# Patient Record
Sex: FEMALE | Race: WHITE | NOT HISPANIC OR LATINO | URBAN - METROPOLITAN AREA
[De-identification: names, ages, dates, MRNs, and addresses within clinical notes are randomized per-mention and may not be internally consistent; named-entity substitution may affect disease eponyms.]

---

## 2019-08-06 ENCOUNTER — TELEPHONE (OUTPATIENT)
Dept: TRANSPLANT | Facility: CLINIC | Age: 69
End: 2019-08-06

## 2019-08-06 NOTE — TELEPHONE ENCOUNTER
----- Message from Marlene Montes sent at 8/6/2019  6:54 PM CDT -----    Called and sp to pt daughter, Elisha, about sending pt insur cards, back and front. She will e-mail a coyp to [email or web address]

## 2019-08-12 ENCOUNTER — TELEPHONE (OUTPATIENT)
Dept: TRANSPLANT | Facility: CLINIC | Age: 69
End: 2019-08-12

## 2019-08-12 DIAGNOSIS — B19.20 COMPENSATED HCV CIRRHOSIS: ICD-10-CM

## 2019-08-12 DIAGNOSIS — K74.69 COMPENSATED HCV CIRRHOSIS: ICD-10-CM

## 2019-08-14 ENCOUNTER — TELEPHONE (OUTPATIENT)
Dept: TRANSPLANT | Facility: CLINIC | Age: 69
End: 2019-08-14

## 2019-08-14 NOTE — TELEPHONE ENCOUNTER
----- Message from Marlene Montes sent at 8/14/2019 10:15 AM CDT -----    Called and sp to pt daughter to [facility] pt appts; pt is [facility]'ed for FAST PASS 9/19 & 9/20. Will e-mail IDRIS

## 2019-08-16 DIAGNOSIS — Z76.82 ORGAN TRANSPLANT CANDIDATE: ICD-10-CM

## 2019-08-16 DIAGNOSIS — B19.20 COMPENSATED CIRRHOSIS RELATED TO HEPATITIS C VIRUS (HCV): Primary | ICD-10-CM

## 2019-08-16 DIAGNOSIS — K74.69 COMPENSATED CIRRHOSIS RELATED TO HEPATITIS C VIRUS (HCV): Primary | ICD-10-CM

## 2019-08-24 NOTE — TELEPHONE ENCOUNTER
Natalie Antony reviewed discharge instructions with the parent. The parent verbalized understanding. All questions and concerns were addressed. The patient is discharged ambulatory in the care of family members with instructions and prescriptions in hand. Pt is alert and oriented x 4. Respirations are clear and unlabored. Referral received from Dr Farndy KENNY   Patient with AIH/SORTO/HCV CIRR . MELD 22  ICD-10:  B19.20, K74.69  Referred for liver transplant for  EVALUATION.    Referral completed and forwarded to [facility].          Insurance: [employer]

## 2019-09-16 ENCOUNTER — TELEPHONE (OUTPATIENT)
Dept: TRANSPLANT | Facility: CLINIC | Age: 69
End: 2019-09-16

## 2019-09-16 NOTE — TELEPHONE ENCOUNTER
Call pt to do history and physical checklist no answer but left detail message for pt to return call

## 2019-09-18 ENCOUNTER — TELEPHONE (OUTPATIENT)
Dept: TRANSPLANT | Facility: CLINIC | Age: 69
End: 2019-09-18

## 2019-09-18 NOTE — TELEPHONE ENCOUNTER
Patient called to confirm that she will be attending her scheduled appointments for Liver Transplant Fast Pass scheduled to start on 9/18/19 at 0730.  Spoke with the patient's daughter who confirms that the they will be present for the appointments.  Appointments reviewed along with the location , directions and special instructions.  Questions answered and concerns addressed at this time.

## 2019-09-19 ENCOUNTER — PATIENT MESSAGE (OUTPATIENT)
Dept: TRANSPLANT | Facility: CLINIC | Age: 69
End: 2019-09-19

## 2019-09-19 ENCOUNTER — EVALUATION (OUTPATIENT)
Dept: TRANSPLANT | Facility: CLINIC | Age: 69
End: 2019-09-19
Payer: MEDICARE

## 2019-09-19 VITALS
WEIGHT: 204.81 LBS | HEART RATE: 84 BPM | BODY MASS INDEX: 31.04 KG/M2 | RESPIRATION RATE: 16 BRPM | OXYGEN SATURATION: 98 % | SYSTOLIC BLOOD PRESSURE: 120 MMHG | HEIGHT: 68 IN | TEMPERATURE: 97 F | DIASTOLIC BLOOD PRESSURE: 57 MMHG

## 2019-09-19 DIAGNOSIS — B19.20 COMPENSATED CIRRHOSIS RELATED TO HEPATITIS C VIRUS (HCV): ICD-10-CM

## 2019-09-19 DIAGNOSIS — Z01.818 PRE-TRANSPLANT EVALUATION FOR CHRONIC LIVER DISEASE: ICD-10-CM

## 2019-09-19 DIAGNOSIS — K74.69 COMPENSATED CIRRHOSIS RELATED TO HEPATITIS C VIRUS (HCV): ICD-10-CM

## 2019-09-19 DIAGNOSIS — K74.69 DECOMPENSATED CIRRHOSIS RELATED TO HEPATITIS C VIRUS (HCV): ICD-10-CM

## 2019-09-19 DIAGNOSIS — Z76.82 ORGAN TRANSPLANT CANDIDATE: ICD-10-CM

## 2019-09-19 DIAGNOSIS — B19.20 DECOMPENSATED CIRRHOSIS RELATED TO HEPATITIS C VIRUS (HCV): ICD-10-CM

## 2019-09-19 LAB
AMPHET+METHAMPHET UR QL: NEGATIVE
BARBITURATES UR QL SCN>200 NG/ML: NEGATIVE
BENZODIAZ UR QL SCN>200 NG/ML: NEGATIVE
BZE UR QL SCN: NEGATIVE
CANNABINOIDS UR QL SCN: NEGATIVE
CREAT UR-MCNC: 85 MG/DL (ref 15–325)
ETHANOL UR-MCNC: <10 MG/DL
METHADONE UR QL SCN>300 NG/ML: NEGATIVE
OPIATES UR QL SCN: NEGATIVE
PCP UR QL SCN>25 NG/ML: NEGATIVE
TOXICOLOGY INFORMATION: NORMAL

## 2019-09-19 PROCEDURE — 99999 PR PBB SHADOW E&M-EST. PATIENT-LVL IV: ICD-10-PCS | Mod: PBBFAC,TXP,, | Performed by: INTERNAL MEDICINE

## 2019-09-19 PROCEDURE — 80307 DRUG TEST PRSMV CHEM ANLYZR: CPT | Mod: TXP

## 2019-09-19 PROCEDURE — 99205 OFFICE O/P NEW HI 60 MIN: CPT | Mod: S$GLB,TXP,, | Performed by: INTERNAL MEDICINE

## 2019-09-19 PROCEDURE — 97802 PR MED NUTR THER, 1ST, INDIV, EA 15 MIN: ICD-10-PCS | Mod: S$GLB,TXP,, | Performed by: DIETITIAN, REGISTERED

## 2019-09-19 PROCEDURE — 97802 MEDICAL NUTRITION INDIV IN: CPT | Mod: S$GLB,TXP,, | Performed by: DIETITIAN, REGISTERED

## 2019-09-19 PROCEDURE — 99203 PR OFFICE/OUTPT VISIT, NEW, LEVL III, 30-44 MIN: ICD-10-PCS | Mod: S$GLB,TXP,, | Performed by: TRANSPLANT SURGERY

## 2019-09-19 PROCEDURE — 99205 PR OFFICE/OUTPT VISIT, NEW, LEVL V, 60-74 MIN: ICD-10-PCS | Mod: S$GLB,TXP,, | Performed by: INTERNAL MEDICINE

## 2019-09-19 PROCEDURE — 99999 PR PBB SHADOW E&M-EST. PATIENT-LVL IV: CPT | Mod: PBBFAC,TXP,, | Performed by: INTERNAL MEDICINE

## 2019-09-19 PROCEDURE — 99204 OFFICE O/P NEW MOD 45 MIN: CPT | Mod: S$GLB,TXP,, | Performed by: PHYSICIAN ASSISTANT

## 2019-09-19 PROCEDURE — 99203 OFFICE O/P NEW LOW 30 MIN: CPT | Mod: S$GLB,TXP,, | Performed by: TRANSPLANT SURGERY

## 2019-09-19 PROCEDURE — 99204 PR OFFICE/OUTPT VISIT, NEW, LEVL IV, 45-59 MIN: ICD-10-PCS | Mod: S$GLB,TXP,, | Performed by: PHYSICIAN ASSISTANT

## 2019-09-19 RX ORDER — ONDANSETRON 4 MG/1
4 TABLET, FILM COATED ORAL EVERY 6 HOURS PRN
COMMUNITY

## 2019-09-19 RX ORDER — LACTULOSE 10 G/15ML
10 SOLUTION ORAL; RECTAL 2 TIMES DAILY
COMMUNITY

## 2019-09-19 RX ORDER — CALCIUM CARBONATE 500(1250)
2 TABLET ORAL DAILY
COMMUNITY

## 2019-09-19 RX ORDER — SPIRONOLACTONE 100 MG/1
100 TABLET, FILM COATED ORAL DAILY
COMMUNITY

## 2019-09-19 RX ORDER — ATENOLOL 25 MG/1
12.5 TABLET ORAL EVERY OTHER DAY
COMMUNITY

## 2019-09-19 RX ORDER — ENOXAPARIN SODIUM 100 MG/ML
80 INJECTION SUBCUTANEOUS 2 TIMES DAILY
COMMUNITY

## 2019-09-19 RX ORDER — CHOLECALCIFEROL (VITAMIN D3) 25 MCG
2000 TABLET ORAL DAILY
COMMUNITY

## 2019-09-19 RX ORDER — FUROSEMIDE 40 MG/1
40 TABLET ORAL DAILY
COMMUNITY

## 2019-09-19 NOTE — PROGRESS NOTES
Patient seen in clinic with caregiver.  Consents reviewed and signatures obtained.  Patient appointments reviewed location and special instructions.  Patient questions answered and concerns addressed.  Patient assisted with patient portal.  Questions answered and concerns addressed.

## 2019-09-19 NOTE — PROGRESS NOTES
Pre Transplant Infectious Diseases Consult  Liver Transplant Recipient Evaluation    Requesting Physician: Dr. Tonia Young    Reason for Visit:  Pre Transplant Evaluation    Organ:  Liver    Etiology of Liver Disease:  Hepatitis C; treated and cured    History of Prior Transplant:  No    Currently taking immunosuppressants/steroids:  No    History of Splenectomy:  No    Infectious History:  Current/recent infections or currently taking antibiotics?  No  History of recurrent infections (sinuses, throat, bladder/kidneys, intestines, skin, dental, lung, catheter (HD/PD) related, or peritonitis/SBP)?  No  Any major hospitalizations due to infection?  No  If diabetic, history of diabetic foot infection/osteomyelitis?  No  History of shingles?  Yes; had zostavax vaccine, not shingrix  History of STDs (syphilis, viral hepatitis, HIV)?  Yes - Hepatitis C; treated  Exposure to TB or ever had a positive TB skin test?  No  History of residence in coccidioides endemic areas ([facility]?  No  Any foreign travel?  No     Social/Environmental:  Occupational:  [employer]; [employer]; [employer]; now retired  Animal exposures (dogs, cats, farm animals, bird cages, fish tanks):  No  Hobbies (gardening, hike, fish/hunting, etc): indoor  Consumption of raw/undercooked meat or seafood?  No  Any injectable or smoked recreational drug use?  No    Immunization History:  Childhood vaccines:  Yes  Last Flu shot: 2018  Tetanus/TDAP: 2016  Hepatitis A: Hep A IgG (+) chart review  Hepatitis B: 2016- 2017; [id number]  Prevnar-13: 2017  Pneumovax-23: 2018  Shingles (Zostavax/Shingrix): 2011      Immunizations: (from [facility] records)  Name Administration Dates Next Due   HBV adult (Hepatitis B) 02/20/2019, 01/16/2019, 12/12/2018, 06/05/2017, 10/17/2016, 09/02/2016     INFs (Influenza split virus ). 09/19/2014, 10/29/2013     INFs 9yrs-adult (AFLURIA) (Influenza) 10/22/2015     INFs Pres Free High Dose (FLUZONE) (influenza) 09/23/2018, 09/21/2016      INFs pres free 3yrs-adult (Influenza) 10/16/2012, 10/19/2011, 11/15/2010     INFs pres free 65yrs and over (FLUAD Trivalent IIV3) (Influenza) 10/28/2017     PNUcn13 (PREVNAR 13) (Pneumococcal conjugate, 13 valent) 01/12/2017, 01/19/2016     PPSV23 (Pneumococcal polysaccharide) 05/24/2018     Tdap (ADACEL) (Tetanus, diphtheria, acellular pertussis) 01/19/2016     ZOS (Zostervirus live, shingles) 11/07/2011     td 7yrs-adult (tetanus, diphtheria), adsorbed 02/02/2005        Serologies:  Reviewed per [facility] labs in care everywhere:    Quant gold negative in 2017  HIV 1 and 2 antibodies negative 2018  Strongyloides IgG negative 2019  Coccidioides IgG negative 2019  Varicella IgG positive 2018  Hep C antibody (+)  Hep B S antigen/Hep B core antibody negative  RPR- drawn today; pending.   HIV pending from today    Review of Systems   Constitution: Negative for chills, decreased appetite, fever, malaise/fatigue, night sweats, weight gain and weight loss.   HENT: Negative for congestion, ear pain, hearing loss, hoarse voice, sore throat and tinnitus.    Eyes: Negative for blurred vision, redness and visual disturbance.   Cardiovascular: Negative for chest pain, leg swelling and palpitations.   Respiratory: Negative for cough, hemoptysis, shortness of breath, sputum production and wheezing.    Endocrine: Negative for cold intolerance and heat intolerance.   Hematologic/Lymphatic: Negative for adenopathy. Does not bruise/bleed easily.   Skin: Negative for dry skin, itching, rash and suspicious lesions.   Musculoskeletal: Negative for back pain, joint pain, myalgias and neck pain.   Gastrointestinal: Negative for abdominal pain, constipation, diarrhea, heartburn, nausea and vomiting.   Genitourinary: Negative for dysuria, flank pain, frequency, hematuria, hesitancy and urgency.   Neurological: Negative for dizziness, headaches, numbness, paresthesias and weakness.   Psychiatric/Behavioral: Negative for depression and  memory loss. The patient does not have insomnia and is not nervous/anxious.    Allergic/Immunologic: Negative for environmental allergies, HIV exposure, hives and persistent infections.     Physical Exam   Constitutional: She is oriented to person, place, and time. She appears well-developed and well-nourished. No distress.   HENT:   Head: Normocephalic and atraumatic.   Mouth/Throat: Uvula is midline, oropharynx is clear and moist and mucous membranes are normal. No oral lesions.   Eyes: Pupils are equal, round, and reactive to light. Conjunctivae and EOM are normal. No scleral icterus.   Neck: Normal range of motion.   Cardiovascular: Normal rate, regular rhythm and normal heart sounds. Exam reveals no gallop and no friction rub.   No murmur heard.  Pulmonary/Chest: Effort normal and breath sounds normal. No respiratory distress. She has no wheezes. She has no rales.   Abdominal: Soft. Bowel sounds are normal. She exhibits no distension and no mass. There is no hepatosplenomegaly. There is no tenderness. There is no rebound and no guarding.   Musculoskeletal: Normal range of motion. She exhibits edema.   Lymphadenopathy:        Head (right side): No submental, no submandibular, no tonsillar, no preauricular, no posterior auricular and no occipital adenopathy present.        Head (left side): No submental, no submandibular, no tonsillar, no preauricular, no posterior auricular and no occipital adenopathy present.     She has no cervical adenopathy.   Neurological: She is alert and oriented to person, place, and time.   Skin: Skin is warm, dry and intact. No rash noted.   Psychiatric: She has a normal mood and affect. Her behavior is normal.            Counseling:   I discussed with the patient the risk for increased susceptibility to infections following transplantation including increased risk for infection right after transplant and if rejection should occur.  The patient has been counseled on the importance of  vaccinations including but not limited to a yearly flu vaccine. Patient was also instructed to encourage that family/caretakers receive their flu vaccine yearly. The patient was encouraged to contact us about any problems that may develop after immunizations and possible side effects were reviewed.     Specific guidance has been provided to the patient regarding the patient's occupation, hobbies and activities to avoid future infectious complications. These include but are not limited to: avoiding raw/undercooked meats and seafood, avoiding unpasteurized milk/cheeses, proper (hand) hygiene, contact with animals and appropriate vaccination of animals, use of mosquito/tick precautions, avoiding walking barefoot, avoiding sick contacts, and seeking medical advice prior to foreign travel (specifically developing countries).     Transplant Candidacy: Based on available information, there are no identified significant barriers to transplantation from an infectious disease standpoint pending acceptable serologies and subject to recommendations below.     Final determination of transplant candidacy will be made once evaluation is complete and reviewed by the Transplant Selection Committee.      ID recommendations:      HIV and RPR pending. If positive, please refer to ID clinic.    Vaccines:   Recommend shingrix (rx given to receive at [facility])  Recommend flu vaccine (at [facility])  If Hep B S Ab negative, recommend heplisav-B series    Zayda Tripp PA-C

## 2019-09-19 NOTE — PROGRESS NOTES
Transplant Hepatology  Liver Transplant Recipient Evaluation      Consultation started: 9/19/2019 at 10:23 AM     Original Referring Provider:   Current Corresponding Physician:     CHEYENNE Native Liver Diagnosis: Cirrhosis: Type C    Reason for Visit: evaluation for liver transplant     Subjective:     Shannon Harden is a 69 y.o. female with ESLD secondary to chronic hepatitis C and possible SORTO.  She is accompanied by her daughter.    The patient reports being diagnosed with hepatitis C in 2003 incidentally while following with oncology for treatment of left sided breast cancer.  Likely exposure was cholecystectomy in 1970s which she reports was complicated and required blood transfusion.  She was referred for treatment with interferon and ribavirin in 2005 and achieved SVR after 6 months of therapy.  Hepatitis C RNA is negative confirming cure.    The patient reports that liver tests have remained elevated despite hepatitis C treatment.  She has been worked up for other etiologies of chronic liver disease and also carries a diagnosis of SORTO after steatosis was noted on imaging but does not have traditional risk factors.  There is no history of DM, hypertension, hyperlipidemia or obesity.  The patient does not have a positive alcohol history.  Ultimately underwent liver biopsy in 2017 and there was a nonspecific interface and lobular hepatitis without plasma cell with evidence of cirrhosis.  Serologic work-up for AIH is negative ASMA, IgG 1930 and positive MARCOS.      Complications of portal hypertension include recurrent left sided hepatic hydrothorax.  She underwent weekly thoracentesis x 1 year but more recently effusion has become loculated and no significant symptom relief with procedure.  She reports that she has not thoracentesis x 6 weeks and remains clinically stable with mild dyspnea with exertion but able to perform ADLs without significant difficulty.  Mild ascites with most recent paracentesis 1 week ago  with 1.1L fluid removal.  She is on lasix 40mg and spironolactone 100mg daily.  Titration has been limited by DARYL and hyponatremia.  HE present requiring lactulose with generally 2-3 bowel movements daily.  She has not had hospitalizations related to HE and never been prescribed rifaximin.  No history of GIB.     Recent hospitalization in August 2019 for choledocholithiasis requiring ERCP and complicated by hyponatremia.      The patient is currently listed at [facility].  MELD has been in high teens.      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 Negative for activity change, chills, fever and unexpected weight change.   HENT: Negative for hearing loss, rhinorrhea and trouble swallowing.    Eyes: Negative for visual disturbance.   Respiratory: Positive for shortness of breath.    Cardiovascular: Negative for chest pain.   Gastrointestinal: Negative for abdominal distention, abdominal pain, nausea and vomiting.   Endocrine: Positive for cold intolerance. Negative for heat intolerance.   Musculoskeletal: Negative for gait problem.   Skin: Negative for rash.   Neurological: Negative for weakness and headaches.   Hematological: Negative for adenopathy. Bruises/bleeds easily.   Psychiatric/Behavioral: Positive for decreased concentration. Negative for confusion.       Objective:   Physical Exam   Constitutional: She is  oriented to person, place, and time. She appears well-developed and well-nourished. No distress.   Patient able to get onto exam table independently   HENT:   Head: Normocephalic and atraumatic.   Mouth/Throat: Oropharynx is clear and moist. No oropharyngeal exudate.   Eyes: Pupils are equal, round, and reactive to light. EOM are normal. No scleral icterus.   Neck: Normal range of motion. Neck supple. No thyromegaly present.   Cardiovascular: Normal rate, regular rhythm and normal heart sounds. Exam reveals no gallop and no friction rub.   No murmur heard.  Pulmonary/Chest: Effort normal. No respiratory distress. She has no wheezes. She has no rales.   Decreased BS on left   Abdominal: Soft. Bowel sounds are normal. She exhibits no distension. There is no tenderness.   Musculoskeletal: Normal range of motion. She exhibits edema (mild).   Lymphadenopathy:     She has no cervical adenopathy.   Neurological: She is alert and oriented to person, place, and time. No cranial nerve deficit.   Skin: Skin is warm and dry. No rash noted.   Ecchymoses on bilateral UEs   Psychiatric: She has a normal mood and affect. Her behavior is normal.   Vitals reviewed.       MELD-Na score: 18 at 9/19/2019  7:40 AM  MELD score: 14 at 9/19/2019  7:40 AM  Calculated from:  Serum Creatinine: 1.1 mg/dL at 9/19/2019  7:40 AM  Serum Sodium: 132 mmol/L at 9/19/2019  7:40 AM  Total Bilirubin: 3.0 mg/dL at 9/19/2019  7:40 AM  INR(ratio): 1.2 at 9/19/2019  7:40 AM  Age: 69 years     Lab Results   Component Value Date     09/19/2019    BUN 16 09/19/2019    CREATININE 1.1 09/19/2019    CALCIUM 9.9 09/19/2019     (L) 09/19/2019    K 4.5 09/19/2019     09/19/2019    PROT 6.8 09/19/2019    CO2 24 09/19/2019    WBC 3.17 (L) 09/19/2019    RBC 3.03 (L) 09/19/2019    HGB 9.5 (L) 09/19/2019    HCT 30.3 (L) 09/19/2019    PLT 68 (L) 09/19/2019     Lab Results   Component Value Date    ALBUMIN 3.2 (L) 09/19/2019    BILITOT 3.0 (H) 09/19/2019     AST 74 (H) 09/19/2019    ALT 35 09/19/2019    ALKPHOS 367 (H) 09/19/2019    LABPROT 12.0 09/19/2019    INR 1.2 09/19/2019     Mildly elevated CEA 9.1  Restrictive lung pattern with low DLCO in setting of known large recurrent pleural effusion    EGD:   Varices x 2 columns s/p banding  PHG    Colonoscopy 1/2017 - 14 polyps throughout colon - hyperplastic (recommended for 1 year f/u)  Need colonoscopy from 1/2018 if performed     Diagnostics: none    1. 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 Organ transplant candidate    3. Pre-transplant evaluation for chronic liver disease    4. Compensated HCV cirrhosis        Transplant Hepatology - Candidacy   Assessment/Plan:     Transplant Candidacy: Shannon Harden is a 69 y.o. female with ESLD secondary to hepatitis C here for evaluation for possible OLT.  In my opinion, she is a good candidate for liver transplant.  She will be presented to selection committee after all tests and evaluations are complete.    Patient has no medical contraindications for liver transplant evaluation.  Given that frequent thoracentesis is no longer required, agree with transitioning back to coumadin from lovenox.  If patient is approved for transplant listing, would expect MELD to increase to mid 20s with therapeutic INR.  Patient and daughter understand that she may be asked to return for favorable position on transplant list.  This will be based on her surgical complexity given her prior complicated cholecystectomy and will be determined during selection meeting.    Hepatic hydrothorax:  No change in diuretic therapy    Hyponatremia:  Mild, no significant fluid restriction required at this time     HE:  Discussed titration of lactulose for 2-3 bowel movements; recommend starting zinc 220mg daily given zinc deficiency from external labs    Functional status:  Appropriate, no significant concerns for frailty at this time.  Encouraged to maintain adequate nutrition and  functional status.      RTC for relocation based on committee decision    Tonia Young MD         UNOS Patient Status  Functional Status: 60% - Requires occasional assistance but is able to care for needs  Physical Capacity: No Limitations    Outside Records Request: colonoscopy report 1/2018

## 2019-09-19 NOTE — PROGRESS NOTES
PRE EDUCATION TEACHING NOTE    Shannon Harden was seen in clinic today.  Handbook on pre-liver transplant information (see outline below) was given to the patient and time was allowed for questions.  Patient's daughter, Elisha accompanied her.  Informed consent signed and written information given on selection criteria.    LIVER TRANSPLANT WORK-UP EDUCATION   I. UNDERSTANDING THE TRANSPLANT PROCESS     A. Transplant team      B. MELD score      C. Balancing urgency and outcome     D. Liver Transplant Options         1.  Donor         2. Living Donor--rationale, benefits     E. Transplant Work-up         1. Medical         2. Psychological and Social--lifetime commitment, life changes, personal plan/ goal         3. Financial--fundraising     F.  Completed work-up and Next Steps    G. Wait Time         1.  Can be listed at more than one center         2.  Can transfer wait time     H. The Call       I. Possible donor options         1. DCD         2. Hep B Core and Hep C Positive         3. Increased Risk     J.  Liver Transplant Surgery         1. Length         2. Transfusions, cell saver         3. Surgical risks         4. What to expect after sugery--Central lines, drains, Roque catheter, incision, endotracheal              tube, NG tube, length of stay in ICU/ TSU  II.  HOW TO BEST CARE FOR YOURSELF (Take Five To Thrive)  III. UNDERSTANDING LIFE AFTER TRANSPLANT  A. Medicines after transplant      1. Immunosuppression--infection and rejection  B. Labs   IV. ADULT LIVER SURVIVAL RATES

## 2019-09-19 NOTE — PROGRESS NOTES
Transplant Recipient Adult Psychosocial Assessment    Shannon Harden  [address]  [facility] [address]  Telephone Information:   Mobile [phone]   Home  [phone] (home)  Work  There is no work phone number on file.  E-mail  [email or web address]    Sex: female  YOB: 1950  Age: 69 y.o.    Encounter Date: 2019  [place] Citizen: yes  Primary Language: English   Needed: no    Emergency Contact:  Name: Elisha Harden   Relationship: daughter  Address: Same address   Phone Numbers:  [phone] (home), n/a (work), [phone] (mobile)    Family/Social Support:   Number of dependents/: Elisha Harden only child 46y.o.   Marital history: Pt reported she is not  and never has been .    Other family dynamics: One sister and one brother both are  and her parents.  Pt reported she does not have any pets in the home.     Household Composition:  Name: Shannon Harden   Age: 69 y.o.  Relationship: patient  Does person drive? yes can drive but does not drive any more.      Name: Elisha Harden   Age: 46 y.o.  Relationship: daughter  Does person drive? yes will use public transportation     Do you and your caregivers have access to reliable transportation? yes Public using Uber  PRIMARY CAREGIVER: Elisha Harden  will be primary caregiver, phone number [phone]. Works full time for [employer] in [employer].  Paid time off.  Will complete FMLA and leave of absence.       provided in-depth information to patient and caregiver regarding pre- and post-transplant caregiver role.   strongly encourages patient and caregiver to have concrete plan regarding post-transplant care giving, including back-up caregiver(s) to ensure care giving needs are met as needed.    Patient and Caregiver states understanding all aspects of caregiver role/commitment and is able/willing/committed to being caregiver to the fullest extent  "necessary.    Patient and Caregiver verbalizes understanding of the education provided today and caregiver responsibilities.         remains available. Patient and Caregiver agree to contact  in a timely manner if concerns arise.      Able to take time off work without financial concerns: yes Elisha reported she will take a leave of absence from her job in addition to FMLA .     Additional Significant Others who will Assist with Transplant:  Name: Cintia Harden [facility] back up as well ph#[phone] (confirmed)  of the pts brother and willing to assist in any way possible and willing to travel.   Age: 1954   City: [facility]: [place]  Relationship: CARLOS   Does person drive? yes    Name: Zayda Zapata ph# [phone] (confirmed) Zayda reported Elisha is her best friend and willing to relocate if she has to travel to assist with caregiving.   Age: 46 y.o.  City: [facility]: [place]   Relationship: friend  Does person drive? yes    Living Will: yes  Healthcare Power of : yes  Advance Directives on file: <<no information> per medical record.  Verbally reviewed LW/HCPA information.   provided patient with copy of LW/HCPA documents and provided education on completion of forms.    Living Donors: No.    Highest Education Level: High School (9-12) or GED  Reading Ability: 12th grade  Reports difficulty with: hearing pt reported her right ear is better and her left ear is losing.  Pt wears glasses.  Pt reported she has some memory loss and comprehension issues "its a little foggy."   Learns Best By:  Hands on demonstrations      Status: no  VA Benefits: no     Working for Income: No  If no, reason not working: Patient Choice - Retired since 2015  Patient is retired from [employer] where she was the  .    Spouse/Significant Other Employment: Single     Disabled: no    Monthly Income:  assisted: $1600.00 $  $800.00 monthly from [employer]  $340.00 Monthly [employer] pension "     Able to afford all costs now and if transplanted, including medications: yes  Patient and Caregiver verbalizes understanding of personal responsibilities related to transplant costs and the importance of having a financial plan to ensure that patients transplant costs are fully covered.       provided fundraising information/education. Patient and Caregiververbalizes understanding.   remains available.    Insurance:   Payor/Plan Subscr  Sex Relation Sub. Ins. ID Effective Group Num   1. GENERIC MEDIC* [place] 1950 Female Self [id number]                                    [address] [address], [facility] [address]     Primary Insurance (for UNOS reporting): Private Insurance [employer] Coordinator Ra Grajeda # [phone]  Secondary Insurance (for UNOS reporting): None  Patient and Caregiver verbalizes clear understanding that patient may experience difficulty obtaining and/or be denied insurance coverage post-surgery. This includes and is not limited to disability insurance, life insurance, health insurance, burial insurance, long term care insurance, and other insurances.      Patient and Caregiver also reports understanding that future health concerns related to or unrelated to transplantation may not be covered by patient's insurance.  Resources and information provided and reviewed.     Patient and Caregiver provides verbal permission to release any necessary information to outside resources for patient care and discharge planning.  Resources and information provided are reviewed.        Infusion Service: patient utilizing? no  Home Health: patient utilizing? no  DME: yes WC for long distances   Pulmonary/Cardiac Rehab: NONE    ADLS:  Pt is independent with ADLs and does not require assistance     Adherence:   Pt reported high compliance.  Adherence education and counseling provided.     Per History Section:  No past medical history on file.  Social History     Tobacco  Use    Smoking status: Not on file   Substance Use Topics    Alcohol use: Not on file     Social History     Substance and Sexual Activity   Drug Use Not on file     Social History     Substance and Sexual Activity   Sexual Activity Not on file       Per Today's Psychosocial:  Tobacco: none, patient denies any use. Pt reported she stopped a year ago on her own.    Alcohol: none, patient denies any use. Pt reported her last drink was years ago.  Pt reported she had a company appreciation dinner and had a glass of wine.     Illicit Drugs/Non-prescribed Medications: none, patient denies any use.    Patient and Caregiver states clear understanding of the potential impact of substance use as it relates to transplant candidacy and is aware of possible random substance screening.  Substance abstinence/cessation counseling, education and resources provided and reviewed.     Arrests/DWI/Treatment/Rehab: patient denies    Psychiatric History:    Mental Health: Pt denied any dx   Psychiatrist/Counselor: The pt denied   Medications:  Pt denied   Suicide/Homicide Issues: Pt denied any current S.I./H.I.    Safety at home: Pt reported she is safe at home free from abuse verbal, physical emotional.      Knowledge: Patient and Caregiver states having clear understanding and realistic expectations regarding the potential risks and potential benefits of organ transplantation and organ donation and agrees to discuss with health care team members and support system members, as well as to utilize available resources and express questions and/or concerns in order to further facilitate the pt informed decision-making.  Resources and information provided and reviewed.    Patient and Caregiver is aware of [facility]'s affiliation and/or partnership with agencies in home health care, LTAC, SNF, [facility], and other hospitals and clinics.    Understanding: Patient and Caregiver reports having a clear understanding of the many lifetime commitments  involved with being a transplant recipient, including costs, compliance, medications, lab work, procedures, appointments, concrete and financial planning, preparedness, timely and appropriate communication of concerns, abstinence (ETOH, tobacco, illicit non-prescribed drugs), adherence to all health care team recommendations, support system and caregiver involvement, appropriate and timely resource utilization and follow-through, mental health counseling as needed/recommended, and patient and caregiver responsibilities.  Social Service Handbook, resources and detailed educational information provided and reviewed.  Educational information provided.    Patient and Caregiver also reports current and expected compliance with health care regime and states having a clear understanding of the importance of compliance.      Patient and Caregiver reports a clear understanding that risks and benefits may be involved with organ transplantation and with organ donation.       Patient and Caregiver also reports clear understanding that psychosocial risk factors may affect patient, and include but are not limited to feelings of depression, generalized anxiety, anxiety regarding dependence on others, post traumatic stress disorder, feelings of guilt and other emotional and/or mental concerns, and/or exacerbation of existing mental health concerns.  Detailed resources provided and discussed.      Patient and Caregiver agrees to access appropriate resources in a timely manner as needed and/or as recommended, and to communicate concerns appropriately.  Patient and Caregiver also reports a clear understanding of treatment options available.     Patient and Caregiver received education in a group setting.   reviewed education, provided additional information, and answered questions.    Feelings or Concerns: Pt reported everything has been     Coping: Pt reported she is doing well.  Pt reported she and her daughter are  helping each other.      Goals: Pt reported she wants to ride her bicycle and go walking.  Pt reported she was working her way to the bike trails and vacationing.  Pt reported she wants to go to [place].  Patient referred to Vocational Rehabilitation.    Interview Behavior: Patient and Caregiver presents as alert and oriented x 4, pleasant, good eye contact, calm, communicative, cooperative and asking and answering questions appropriately.          Transplant Social Work - Candidacy  Assessment/Plan:     Psychosocial Suitability: Patient presents as an excellent candidate for liver transplant at this time. Based on psychosocial risk factors, patient presents as low risk, due to good support, stable committed relationships; caregiver(s) able to provide assistance.  The patient has adequate income to meet needs along with resources and support.  Caregiver reported compliant history with medical follow through.  There is no reported use of substance or history of abuse/dependency.  As well as realistic beliefs regarding risks and benefits of transplant.      Recommendations/Additional Comments: Local Lodging and Fundraising.     Iris Jenkins

## 2019-09-19 NOTE — PROGRESS NOTES
Transplant Surgery  Liver Transplant Recipient Evaluation    Referring Provider:     Subjective:     Reason for Visit: evaluation for liver transplant    History of Present Illness: Shannon Harden is a 69 y.o. female who is being evaluated for liver transplant due to Cirrhosis: Type C. Shannon reports ascites (diuretic-dependent), edema, encephalopathy, fatigue, jaundice and portal hypertension.    Review of Systems   Constitutional: Positive for activity change, appetite change, fatigue and unexpected weight change.   Respiratory: Negative.    Cardiovascular: Negative.    Gastrointestinal: Positive for abdominal distention.   All other systems reviewed and are negative.      Objective:     Physical Exam   Constitutional: She appears well-developed and well-nourished.   Abdominal:       Vitals reviewed.      MELD-Na score: 18 at 9/19/2019  7:40 AM  MELD score: 14 at 9/19/2019  7:40 AM  Calculated from:  Serum Creatinine: 1.1 mg/dL at 9/19/2019  7:40 AM  Serum Sodium: 132 mmol/L at 9/19/2019  7:40 AM  Total Bilirubin: 3.0 mg/dL at 9/19/2019  7:40 AM  INR(ratio): 1.2 at 9/19/2019  7:40 AM  Age: 69 years    Diagnoses:  1. 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 Organ transplant candidate    3. Pre-transplant evaluation for chronic liver disease    4. Compensated HCV cirrhosis        Transplant Surgery - Candidacy   Assessment/Plan:     Transplant Candidacy: Shannon Harden is a 69 y.o. 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  Based on available information, Shannon is a suitable - to increase risk liver transplant candidate.  She will be presented to selection committee after all tests and evaluations are complete.    Czear Berry MD       [facility] Patient Status  Functional Status: 40% - Disabled: requires special care and assistance  Physical Capacity: No Limitations    Counseling: I discussed with Shannon the benefits of liver transplantation.  We  discussed the evaluation and listing procedures.  We discussed the MELD system and the associated waiting times.  We discussed national and center specific survival results.  We discussed the option of being multiply listed in different OPOs.  We discussed the option of living donation versus  donor transplantation and the advantages and relative disadvantages of each.   We discussed the risks, benefits and potential complications related to surgery including the risks related to anesthesia, bleeding, infection, primary non-function of the allograft, the risk of reoperation as well as the risk of death.  We discussed the typical post-operative course, length of hospitalization, the long-term use of immunosuppressive therapy as well as the need for long-term routine followup.    PHS: I discussed the use of organs from donors with PHS increased-risk behavior, including the testing protocols utilized, as well as data from the literature regarding the likelihood of transmission of hepatitis or HIV.  The patient is willing to consider such grafts.  DCD: I discussed the use of organs recovered by donation after cardiac death (DCD), including slightly decreased graft survival and greater risk of arterial and biliary complications. The potential advantage to the recipient is possibly receiving a transplant sooner by accepting such an organ. The patient is willing to consider such grafts.  HBcAb: I discussed the use of organs from donors with HBcAb in conjunction with long term use of HBV antiviral drugs, such as lamivudine. The small but measurable risk of hepatitis B seroconversion was discussed as well as the potentially life long need to continue antiviral drugs. The patient is willing to consider such grafts.  HCV Non-viremic recipient: I discussed the use of HCV-positive organs in naive recipients, including the risk of viral transmission to the patients or others, potential insurance barriers for antiviral  medication coverage, risk for fibrosing cholestatic hepatitis, death or graft loss. The potential advantage to the recipient is the possibility of receiving a transplant sooner with decreased mortality risk by accepting such an organ. The patient is willing to consider such grafts.  LDLT: I discussed the nature of living donor liver transplant, including donor risks and more frequent recipient complications. The patient is willing to consider such grafts.

## 2019-09-19 NOTE — PROGRESS NOTES
PHARM.D. PRE-TRANSPLANT NOTE:    This patient's medication therapy was evaluated as part of her pre-transplant evaluation.      The following general pharmacologic concerns were noted: enoxaparin - patient currently anticoagulated with enoxaparin, previously on warfarin (was changed to enoxaparin due to weekly thoras/procedures, may switch back to warfarin).  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, DVTs, and Afib - will likely need lifelong anticoagulation    The following pharmacologic concerns related to HCV therapy were noted: Patient with a history of Afib - on atenolol for rate control      This patient's medication profile was reviewed for contraindications for DAA Hepatitis C therapy:    [x]  No current inducers of CYP 3A4 or PGP  [x]  No amiodarone on this patient's EMR profile in the last 24 months  [YES]  No past or current atrial fibrillation on this patient's EMR profile       Current Outpatient Medications   Medication Sig Dispense Refill    atenolol (TENORMIN) 25 MG tablet Take 12.5 mg by mouth every other day.      calcium carbonate (OS-VICTOR HUGO) 500 mg calcium (1,250 mg) tablet Take 2 tablets by mouth once daily.       enoxaparin (LOVENOX) 80 mg/0.8 mL Syrg Inject 80 mg into the skin 2 (two) times daily.      furosemide (LASIX) 40 MG tablet Take 40 mg by mouth once daily.      Lactobacillus rhamnosus GG (CULTURELLE) 10 billion cell capsule Take 1 capsule by mouth once daily.      ondansetron (ZOFRAN) 4 MG tablet Take 4 mg by mouth every 6 (six) hours as needed for Nausea.      spironolactone (ALDACTONE) 100 MG tablet Take 100 mg by mouth once daily.      vitamin D (VITAMIN D3) 1000 units Tab Take 2,000 Units by mouth once daily.        No current facility-administered medications for this visit.          Currently Ms Harden is responsible for preparing / administering this patient's medications on a daily basis.  I am available for consultation and can be contacted, as needed by the  other members of the Liver Transplant team.

## 2019-09-19 NOTE — PROGRESS NOTES
"TRANSPLANT NUTRITIONAL ASSESSMENT    Referring Provider: Tonia Young MD    Reason for Visit: Pre-liver transplant work-up    Age: 69 y.o.  Sex: female    Patient Active Problem List   Diagnosis    Compensated HCV cirrhosis     No past medical history on file.  Lab Results   Component Value Date     09/19/2019    K 4.5 09/19/2019    ALBUMIN 3.2 (L) 09/19/2019    CALCIUM 9.9 09/19/2019     Other Pertinent Labs: None    Current Outpatient Medications   Medication Sig    atenolol (TENORMIN) 25 MG tablet Take 12.5 mg by mouth every other day.    calcium carbonate (OS-VICTOR HUGO) 500 mg calcium (1,250 mg) tablet Take 2 tablets by mouth once daily.     enoxaparin (LOVENOX) 80 mg/0.8 mL Syrg Inject 80 mg into the skin 2 (two) times daily.    furosemide (LASIX) 40 MG tablet Take 40 mg by mouth once daily.    Lactobacillus rhamnosus GG (CULTURELLE) 10 billion cell capsule Take 1 capsule by mouth once daily.    lactulose (CHRONULAC) 10 gram/15 mL solution Take 10 g by mouth 2 (two) times daily.    ondansetron (ZOFRAN) 4 MG tablet Take 4 mg by mouth every 6 (six) hours as needed for Nausea.    spironolactone (ALDACTONE) 100 MG tablet Take 100 mg by mouth once daily.    vitamin D (VITAMIN D3) 1000 units Tab Take 2,000 Units by mouth once daily.      No current facility-administered medications for this visit.      Allergies: Hydrocodone-acetaminophen and Hydromorphone    Ht Readings from Last 1 Encounters:   09/19/19 5' 8.39" (1.737 m)     Wt Readings from Last 1 Encounters:   09/19/19 92.9 kg (204 lb 12.9 oz)      BMI: Body mass index is 30.79 kg/m².    Usual Weight: 283lb  Weight Change/Time: -80lb x 1 year per reported UBW  Current Diet: Low sodium  Appetite/Current Intake: fair , improving since bile duct stones addressed and no longer doing thoracentesis  Exercise/Physical Activity: Activity improving since not doing thoracentesis per pt, has SOB but able to wash dishes, etc., uses wheelchair when out of " house  Nutritional/Herbal Supplements: Vit D, Ca  Potential Food/Medication Interactions: Atenolol - avoid natural licorice, take separately from Ca supplement  Chewing/Swallowing Problems: None  Symptoms: none, had frequent GI distress but improving  Assessment of Lab Values:  Support System: Daughter present, supportive of pt's nutrition care and cooks for pt    Estimated Kcal Need: 2323 kcal (25 kcal/kg)  Estimated Protein Need: 111 gm (1.2 gm/kg)    Nutritional History: Pt reports that appetite has improved over past 2 weeks after being poor for a year due to GI distress likely associated with bile duct stones that were recently found per pt and daughter. Reports wt loss of 80lb during this time. Daughter cooks for pt, does not use salt but pt does salt some at table. Eats out 1x/week when out for appts at places like [employer], Mexican restaurants. Pt presents in a wheelchair, but states that she is able to walk around and do chores at home though she does have SOB. Physical activity/appetite has improved since stopping thoracentesis per pt as she felt very badly for a couple days after. Pt and daughter expressed some confusion regarding protein needs as they had been told to not drink protein drinks. Beverages include tea made with Splenda, water and milk. Pt provided the following diet recall:  B: skips sometimes, or 1/2 bagel with light cream cheese and class of milk  Snack: pretzels  L: skips (sleeping) or chicken noodle soup with crackers and milk  D: spaghetti squash with mushrooms and salmon patties or spaghetti with meat sauce or spinach quesdillas  Snack: ice cream, Jell-o    Nutritional Diagnoses  Problem: food- and nutrition-related knowledge deficit  Etiology: receiving conflicting information from providers regarding protein/sodium needs with liver disease  Symptoms: diet recall, pt/caregiver questions/comments    Educational Need? yes  Barriers: none identified  Discussed with: patient  and daughter  Interventions: Patient taught nutrition information regarding Pre-liver transplant work-up.  Provided education on protein content in foods, goal intake per day, suggestions for ways to reach protein intake goal; nutrition supplements, snacks. Encouraged pt to not skip meals, include a protein drink daily.  Reviewed Low Sodium packet (low Na diet, foods recommended/not recommended, food label strategies, flavoring tips, & sample menu).  Goals/Recommendations: consumption of maryam nutritional supplements, increase protein intake, choose healthy options when dining out and limit intake of high sodium foods  Actions Taken: instruct/provide written information  Strategies Used: problem solving, goal setting, motivational interviewing  Patient and/or family comprehend instructions: yes , adherence expected  Outcome: Verbalizes understanding  Monitoring: Contact information provided, will f/u in clinic and communicate with the care team as needed.     Counseling Time: 15 minutes

## 2019-09-20 ENCOUNTER — HOSPITAL ENCOUNTER (OUTPATIENT)
Dept: RADIOLOGY | Facility: HOSPITAL | Age: 69
Discharge: HOME | End: 2019-09-20
Attending: INTERNAL MEDICINE
Payer: MEDICARE

## 2019-09-20 DIAGNOSIS — K74.69 COMPENSATED CIRRHOSIS RELATED TO HEPATITIS C VIRUS (HCV): ICD-10-CM

## 2019-09-20 DIAGNOSIS — B19.20 COMPENSATED CIRRHOSIS RELATED TO HEPATITIS C VIRUS (HCV): ICD-10-CM

## 2019-09-20 DIAGNOSIS — Z76.82 ORGAN TRANSPLANT CANDIDATE: ICD-10-CM

## 2019-09-20 PROCEDURE — 76700 US EXAM ABDOM COMPLETE: CPT | Mod: 26,TXP,, | Performed by: RADIOLOGY

## 2019-09-20 PROCEDURE — 93975 VASCULAR STUDY: CPT | Mod: TC,TXP

## 2019-09-20 PROCEDURE — 93975 US ABDOMEN COMP WITH DOPPLER_PRE LIVER TRANSPLANT: ICD-10-PCS | Mod: 26,TXP,, | Performed by: RADIOLOGY

## 2019-09-20 PROCEDURE — 76700 US ABDOMEN COMP WITH DOPPLER_PRE LIVER TRANSPLANT: ICD-10-PCS | Mod: 26,TXP,, | Performed by: RADIOLOGY

## 2019-09-20 PROCEDURE — 93975 VASCULAR STUDY: CPT | Mod: 26,TXP,, | Performed by: RADIOLOGY

## 2019-09-23 ENCOUNTER — PATIENT MESSAGE (OUTPATIENT)
Dept: TRANSPLANT | Facility: CLINIC | Age: 69
End: 2019-09-23

## 2019-09-24 ENCOUNTER — DOCUMENTATION ONLY (OUTPATIENT)
Dept: TRANSPLANT | Facility: CLINIC | Age: 69
End: 2019-09-24

## 2019-09-24 PROBLEM — I48.91 ATRIAL FIBRILLATION: Status: ACTIVE | Noted: 2017-05-17

## 2019-09-24 PROBLEM — Z85.3 HX OF BREAST CANCER: Status: ACTIVE | Noted: 2017-11-07

## 2019-11-12 ENCOUNTER — PATIENT MESSAGE (OUTPATIENT)
Dept: TRANSPLANT | Facility: CLINIC | Age: 69
End: 2019-11-12

## 2019-11-20 ENCOUNTER — TELEPHONE (OUTPATIENT)
Dept: TRANSPLANT | Facility: CLINIC | Age: 69
End: 2019-11-20

## 2019-11-20 ENCOUNTER — DOCUMENTATION ONLY (OUTPATIENT)
Dept: TRANSPLANT | Facility: CLINIC | Age: 69
End: 2019-11-20

## 2019-11-20 ENCOUNTER — COMMITTEE REVIEW (OUTPATIENT)
Dept: TRANSPLANT | Facility: CLINIC | Age: 69
End: 2019-11-20

## 2019-11-20 LAB — EXT ABO: NORMAL

## 2019-11-20 NOTE — COMMITTEE REVIEW
Shannon Harden's case presented to selection committee.  Patient has been accepted for liver transplant due to Cirrhosis: Type C and complications of end stage liver disease including hyperbilirubinemia, hypoalbuminemia, coagulopathy, hepatic encephalopathy, thrombocytopenia, ascites, portal hypertension, splenomegaly, esophageal varices, pancytopenia, SBP, jaundice, fatigue and edema.  with a MELD score of 18.  Patient has no absolute contraindications for liver transplant.  Needs early anticoagulation therapy. Patient will be listed pending financial approval.    Patient will accept HBcAb positive livers.  Patient will not accept HCVAB positive livers.  Patient will accept DCD livers.  Patient will not accept HCV BOBBY positive livers  Patient will accept HBV BOBBY positive livers    I was present at the committee meeting and attest to the decision of the committee.    Pretty Kapadia MD  Staff - Transplant Hepatologist  [facility] [place]

## 2019-11-20 NOTE — TELEPHONE ENCOUNTER
Called pt to inform her that she's been accepted by the liver transplant committee for listing for liver transplant. No answer. Left VM requesting pt have labs re-drawn for listing.

## 2019-11-22 ENCOUNTER — DOCUMENTATION ONLY (OUTPATIENT)
Dept: TRANSPLANT | Facility: CLINIC | Age: 69
End: 2019-11-22

## 2019-11-22 ENCOUNTER — TELEPHONE (OUTPATIENT)
Dept: TRANSPLANT | Facility: CLINIC | Age: 69
End: 2019-11-22

## 2019-11-22 ENCOUNTER — PATIENT MESSAGE (OUTPATIENT)
Dept: TRANSPLANT | Facility: CLINIC | Age: 69
End: 2019-11-22

## 2019-11-22 LAB
EXT ALBUMIN: 3.2
EXT ALT: 23
EXT BILIRUBIN DIRECT: 1.4 MG/DL
EXT BILIRUBIN TOTAL: 3
EXT CHLORIDE: 102
EXT CO2: 24
EXT CREATININE: 0.83 MG/DL
EXT INR: 1.5
EXT POTASSIUM: 4.1
EXT PT: 17.5
EXT SODIUM: 134 MMOL/L

## 2019-11-22 NOTE — TELEPHONE ENCOUNTER
"  LIVER WAIT LISTING NOTE    **NOTE:   IF ANY EXTERNAL LABS ARE USED FOR LISTING THE VALUES AND DATES MUST BE ENTERED IN EPIC TO GENERATE THIS NOTE**    Date of Financial clearance to list: 2019    [facility]:        Organ: Liver  Name:       Shannon Harden    : 1950          Gender:     female      MRN#: [id number]                                 State of Permanent Residence:  [address] [place] [address] [address]  [facility] [address]  Ethnicity: /White   Race:      White    CLINICAL INFORMATION       ABO  ABO Blood Group:   O POS     ABO Confirmation: (THESE DATES MUST BE PRIOR TO THE LIST DATE AND SUPPORTED BY SEPARATE LAB REPORTS)    Internal Results    Lab Results   Component Value Date    GROUPTRH O POS 2019     No results found for: ABO    External Results    ABO Date 1: 2019 ABO Result 1: O Pos  ABO Date 2:  ABO Result 2:     Are either of these ABO results based on External Labs? yes  (If Yes, STOP and go to source document in Media Tab for verification).    VITALS  Height:    Ht Readings from Last 1 Encounters:   19 5' 8.39" (1.737 m)     Weight:    Wt Readings from Last 1 Encounters:   19 92.9 kg (204 lb 12.9 oz)       LIVER ORGAN INFORMATION  Candidate Medical Urgency Status: Active    Number of Previous Transplants: 0    MELD/PELD Data Collection:  Had dialysis twice, or 24 hours of CVVHD, within 1 week prior to the serum creatinine test: No  Encephalopathy: 1 - 2 Date: 2019  Ascites: slight Date: 2019          MELD Score:  MELD-Na score: 18 at 2019  7:40 AM  MELD score: 14 at 2019  7:40 AM  Calculated from:  Serum Creatinine: 1.1 mg/dL at 2019  7:40 AM  Serum Sodium: 132 mmol/L at 2019  7:40 AM  Total Bilirubin: 3.0 mg/dL at 2019  7:40 AM  INR(ratio): 1.2 at 2019  7:40 AM  Age: 69 years  Lab Results   Component Value Date    ALBUMIN 3.2 (L) 2019     Lab Results   Component Value Date    EXTINR 1.5 2019    EXTCREATININ " "0.83 11/18/2019    EXTSODIUM 134 (L) 11/18/2019    EXTBILITOTAL 3.0 (H) 11/18/2019    EXTALBUMIN 3.2 (L) 11/18/2019       Additional Organs: none  Kidney: No    If Kidney is "Yes" above, check Diagnosis and enter the medical eligibility below for a simultaneous liver/kidney:    Diagnosis: Chronic kidney disease (CKD) with measured or calculated GFR less than or equal to 60 ml/min for greater than 90 consecutive days. At least one of the following must qualify for CKD:  Date Begun Dialysis CrCl (ml/min)  Must be < or = 30 eGFR (ml/min) Must be < or = 30    Yes/no:                    Diagnosis: Sustained acute kidney injury (must be confirmed at least once every 7 days). Please select at least one of the following criteria:  Date of test or treatment Dialysis received CrCl (ml/min) Must be < or = 30 eGFR (ml/min) Must be < or = 25 Number of days since previous test or treatment (must be less than or equal to 7 days)    Yes/No:                  Diagnosis: Metabolic disease, Check all diagnosis that apply:     Hyperoxaluria    Atypical hemolytic uremic syndrome (HUS) from mutations in factor H or factor I    Familial non-neuropathic systemic amyloidosis    Methylmalonic aciduria     Transplant nephrologist confirming candidate's most recent diagnosis for SLK: n/a               ## Please submit a separate Kidney Listing note for combined Liver/Kidney patients. ##    Will Recipient Accept?   Accept HBcAB Positive Organ:  yes  Accept HBV BOBBY Positive Organ:  yes  Accept HCV Antibody Positive Organ: no   Accept HCV BOBBY Positive Organ:  no                        Local: No                           Import: No  Accept DCD Organ:    yes  Minimum acceptable donor age:  5 years  Maximum acceptable donor age:  99 years  Minimum acceptable donor weight:  40 lbs    Maximum acceptable donor weight:  440 lb  Maximum miles the organ or  Recovery team will travel:   5000 miles    TCR Information    Citizenship: [place] Citizen   Country of " permanent residence:   Year of entry to the :   Highest education level: High School (9-12) or GED    Patient on Life Support: No  Functional Status: 40% - disabled: requires special care and assistance   Working for income: No  If no, reason not working: Patient Choice - Retired  Previous Pancreas [facility] - No  Source of payment: Private Insurance  Diabetes: No  Any previous malignancy: Yes, Breast  Neoadjuvant Therapy: Yes  Has candidate ever had a diagnosis of HCC: No    Liver Medical Factors  Previous abdominal surgery: Yes  Spontaneous Bacterial Peritonitis: Yes  History of Portal Vein Thrombosis: No  Transjugular Intrahepatic Portosystemic Shunt: No

## 2019-12-19 ENCOUNTER — TELEPHONE (OUTPATIENT)
Dept: TRANSPLANT | Facility: CLINIC | Age: 69
End: 2019-12-19

## 2019-12-19 NOTE — TELEPHONE ENCOUNTER
Received email from pt's [facility] coordinator:    Procurement/transplant team notified. Pt removed from waitlist.

## 2019-12-19 NOTE — LETTER
December 19, 2019    Shannon Harden  [address]  [place] [address]          Dear Shannon Harden:  MRN: [id number]    It is the duty of the [facility] Liver Transplant Selection Committee to determine which patients are candidates for a transplant. For this reason, our committee has the difficult task of evaluating patients to determine which ones have the greatest chance of having a successful transplant. We are aware of the magnitude of this responsibility, and we approach it with reverence and humility.    Per notification From Ra Frances RN, with [facility], we have been informed that you have been transplanted with an organ offer at [facility]. Congratulations! The team wishes you well in your recovery. Since you have received your liver transplant, we have removed you from the [facility] liver transplant waitlist.     The [facility] Liver Selection Committee sincerely wishes you the best and remains available to answer any questions.  Please do not hesitate to contact our pre-transplant office if we can assist you in any other way.                                                                               Sincerely,      Indra Chung MD, PhD  Medical Director, [facility] [place]  , Gastroenterology and Hepatology    [facility]  [address] [place] [id number]  [phone]        Cc: Ra Penaloza ([facility] Coordinator)          OPTN/UNOS: Your Resource for Organ Transplant Information        If you have a question regarding your own medical care, you always should call your transplant center first. However, for general organ transplant-related information, you can call the [employer] Network for Organ Sharing (UNOS) toll-free patient services line at [phone].    Anyone, including potential transplant candidates, recipients, family members/friends, living donors, and/or donor family members can call this number to:    · talk  about organ donation, living donation, how transplant and donation work, the donation process, transplant policies, and transplant/donor information;  · get a free patient information kit with helpful booklets, waiting list and transplant information, and a list of all transplant centers;  · ask questions about the Organ Procurement and Transplantation Network (OPTN) web site (www.optn.transplant.hrsa.gov); the UNOS Web site (www.unos.org); or the UNOS web site for living donors and transplant recipients (www.transplantliving.org);  · learn how UNOS and the OPTN can help you;  · talk about any concerns that you may have with a transplant center and how they perform    UNOS is a not-for-profit organization that provides all of the administrative services for the national OPTN under federal contract to the Health Resources and Services Administration (HRSA), an agency under the U.S. Department of Health and Human Services (HHS).     UNOS and OPTN responsibilities include:    · writing educational material for patients, the public and professionals;  · helping to make people aware of the need for donated organs and tissue;  · writing organ transplant policy with help from doctors, nurses, transplant patients/candidates, donor families, living donors, and the public;  · coordinating the organ matching and placement process;  · collecting information about every organ transplant and donation that occurs in the [place].    Remember, you should contact your transplant center directly if you have questions or concerns about your own medical care including medical records, work-up progress and test reports. [facility] is not your transplant center, and staff at [facility] will not be able to transfer you to your transplant center, so keep your transplant centers phone number handy. But, while you research your transplant needs and learn as much as you can about transplantation and donation, we welcome your call to our toll-free  patient services line at [phone].